# Patient Record
Sex: FEMALE
[De-identification: names, ages, dates, MRNs, and addresses within clinical notes are randomized per-mention and may not be internally consistent; named-entity substitution may affect disease eponyms.]

---

## 2022-07-27 ENCOUNTER — HOSPITAL ENCOUNTER (EMERGENCY)
Dept: HOSPITAL 33 - ED | Age: 67
Discharge: HOME | End: 2022-07-27
Payer: MEDICARE

## 2022-07-27 VITALS — DIASTOLIC BLOOD PRESSURE: 67 MMHG | SYSTOLIC BLOOD PRESSURE: 140 MMHG | HEART RATE: 56 BPM

## 2022-07-27 VITALS — OXYGEN SATURATION: 98 %

## 2022-07-27 DIAGNOSIS — I10: ICD-10-CM

## 2022-07-27 DIAGNOSIS — Z79.84: ICD-10-CM

## 2022-07-27 DIAGNOSIS — E11.9: ICD-10-CM

## 2022-07-27 DIAGNOSIS — M54.50: Primary | ICD-10-CM

## 2022-07-27 DIAGNOSIS — Z79.899: ICD-10-CM

## 2022-07-27 PROCEDURE — 99291 CRITICAL CARE FIRST HOUR: CPT

## 2022-07-27 PROCEDURE — 96372 THER/PROPH/DIAG INJ SC/IM: CPT

## 2022-07-27 PROCEDURE — 72131 CT LUMBAR SPINE W/O DYE: CPT

## 2022-07-27 PROCEDURE — 99283 EMERGENCY DEPT VISIT LOW MDM: CPT

## 2022-07-27 RX ADMIN — KETOROLAC TROMETHAMINE ONE MG: 30 INJECTION, SOLUTION INTRAMUSCULAR; INTRAVENOUS at 12:55

## 2022-07-27 NOTE — ERPHSYRPT
- History of Present Illness


Source: patient


Exam Limitations: no limitations


Patient Subjective Stated Complaint: C/O left lower back pain that started 

approx 3 weeks ago when she was bending over to clip her toenails. C/O that pain

is constant but different depending on what she is doing. Pain is a "grabbing" 

pain and at a 7 unless she moves a certain way and then the pain is a "sharp" 

pain that increases to a 10 and shoots down her thigh at times.


Triage Nursing Assessment: Patient ambulated back to ED. She is alert and 

oriented. No skin alterations noted to left lower back.


Physician History: 





67 yo WF w L CVA/gluteal pain x 3wks. Pain is 7/10, sharp, and worse w movement-

upright position. Pt denies trauma but did fall in April breaking her R tib-fib.

She denies dysuria/hematuria/fever. Pt has seen Dr. Davey about the pain and 

has had XR's. Pain radiates to LLE. Incontinence/paresthesia/paralysis all 

denied.


Timing/Duration: other (3 wks)


Method of Injury: unknown


Quality: sharp


Back Pain Location: paraspinous muscles


Back Pain Radiation: buttocks, upper legs


Severity of Pain-Max: moderate


Severity of Pain-Current: moderate


Modifying Factors: Improves With: movement


Associated Symptoms: lower back pain, No fever, No chills, No sweating, No 

urinary incontinence, No loss of bowel control, No constipation, No nausea, No 

vomiting, No problems urinating, No light-headedness, No dizziness, No numbness 

in legs/feet, No weakness, No sensory/motor loss, No tingling in legs/feet, No 

muscle spasms


Previous symptoms: same symptoms as today


Allergies/Adverse Reactions: 








No Known Drug Allergies Allergy (Verified 22 11:52)


   





Home Medications: 








Metformin HCl [Metformin HCl ER] 1 tab PO DAILY 22 [History]


Tizanidine HCl 4 mg** [Zanaflex 4 MG**] 1 tab PO Q8H PRN PRN 22 [History]





Hx Tetanus, Diphtheria Vaccination/Date Given: Yes


Hx Influenza Vaccination/Date Given: Yes


Hx Pneumococcal Vaccination/Date Given: No


Immunizations Up to Date: Yes





Travel Risk





- International Travel


Have you traveled outside of the country in past 3 weeks: No





- Coronavirus Screening


Are you exhibiting any of the following symptoms?: No


Close contact with a COVID-19 positive Pt in past 14-21 Days: No





- Vaccine Status


Have you recieved a Covid-19 vaccination: Yes


: Moderna





- Vaccination Dates


Date of 2cond Vaccination (if applicable): 





- Review of Systems


Constitutional: No Symptoms


Eyes: No Symptoms


Ears, Nose, & Throat: No Symptoms


Respiratory: No Symptoms


Cardiac: No Symptoms


Abdominal/Gastrointestinal: No Symptoms


Genitourinary Symptoms: No Symptoms


Musculoskeletal: No Symptoms, Back Pain


Skin: No Symptoms


Neurological: No Symptoms


Psychological: No Symptoms


Endocrine: No Symptoms


Hematologic/Lymphatic: No Symptoms


Immunological/Allergic: No Symptoms





- Past Medical History


Pertinent Past Medical History: Yes


Neurological History: Migraines, TIA


ENT History: No Pertinent History


Cardiac History: Hypertension


Respiratory History: No Pertinent History


Endocrine Medical History: Diabetes Type II, Hypothyroidism


Musculoskeletal History: Arthritis


GI Medical History: Gallbladder Disease


 History: No Pertinent History


Psycho-Social History: Anxiety


Female Reproductive Disorders: No Pertinent History


Other Medical History: Insomnia





- Past Surgical History


Past Surgical History: Yes


Gastrointestinal: Cholecystectomy


Female Surgical History:  Section


Other Surgical History: Foot with metal/screws in leg (right)





- Social History


Smoking Status: Former smoker


Exposure to second hand smoke: No


Drug Use: none


Patient Lives Alone: No


Significant Family History: no pertinent family hx





- Nursing Vital Signs


Nursing Vital Signs: 


                               Initial Vital Signs











Temperature  97.5 F   22 11:53


 


Pulse Rate  70   22 11:53


 


Respiratory Rate  18   22 11:53


 


Blood Pressure  131/94   22 11:53


 


O2 Sat by Pulse Oximetry  98   22 11:53








                                   Pain Scale











Pain Intensity []              7


 


Pain Intensity                 7











WNL





- Physical Exam


General Appearance: no apparent distress


Eye Exam: PERRL/EOMI, eyes nml inspection


Ears, Nose, Throat Exam: normal ENT inspection, TMs normal, pharynx normal, 

moist mucous membranes


Neck Exam: normal inspection, non-tender, supple, full range of motion, No 

meningismus, No mass, No Brudzinski, No Kernig's, No carotid bruit


Respiratory Exam: normal breath sounds, lungs clear, airway intact, No resp

iratory distress


Cardiovascular Exam: regular rate/rhythm, normal heart sounds, normal peripheral

 pulses, capillary refill <2 sec, No murmur


Gastrointestinal Exam: soft, normal bowel sounds, No tenderness


Back Exam: other (L gluteal/L posterior-superior iliac crest TTP/Pain w stiff 

leg raises on L), No vertebral tenderness


Neurologic Exam: alert, oriented x 3, cooperative, CNs II-XII nml as tested, 

normal mood/affect, nml cerebellar function, nml station & gait, sensation nml


Skin Exam: normal color, warm, dry


Lymphatic Exam: No adenopathy


**SpO2 Interpretation**: normal


SpO2: 98


O2 Delivery: Room Air





- Course


Nursing assessment & vital signs reviewed: Yes





- CT Exams


  ** Lumbar Spine


CT Interpretation: Discussed w/radiologist (L5-S1 broad based disc bulge wo 

canal stenosis)


Ordered Tests: 


                               Active Orders 24 hr











 Category Date Time Status


 


 LUMBAR SPINE W/O [CT] Stat Exams  22 13:02 Completed








Medication Summary














Discontinued Medications














Generic Name Dose Route Start Last Admin





  Trade Name Freq  PRN Reason Stop Dose Admin


 


Ketorolac Tromethamine  30 mg  22 12:10  22 12:55





  Ketorolac Tromethamine 30 Mg/Ml Inj  IM  22 12:11  30 mg





  STAT ONE   Administration


 


Ketorolac Tromethamine  Confirm  22 12:54 





  Ketorolac Tromethamine 30 Mg/Ml Inj  Administered  22 12:55 





  Dose  





  30 mg  





  .ROUTE  





  .STK-MED ONE  














- Progress


Progress Note: 





22 13:39


30mg IM Toradol


Counseled pt/family regarding: diagnosis, need for follow-up, rad results





- Departure


Departure Disposition: Home


Clinical Impression: 


 Back pain





Condition: Stable


Critical Care Time: No


Referrals: 


ZAHIDA DAVEY MD [Primary Care Provider] - Follow up/PCP as directed


Instructions:  Low Back Pain  (DC)


Additional Instructions: 


Rest/Heat/Massage


Follow up with Dr. Brook Hamlin as needed for pain


Try Norflex instead of Zanaflex


Prescriptions: 


Etodolac 400 mg [Lodine 400 mg] 400 mg PO TID PRN PRN #14 tablet


 PRN Reason: Pain


Orphenadrine Citrate 100 mg*** [Norflex 100 MG Tablet***] 100 mg PO BID PRN PRN 

#14 tab


 PRN Reason: Pain

## 2022-07-27 NOTE — XRAY
Indication: Low back pain 3 weeks.



Multiple contiguous images obtained through the lumbar spine.  Sagittal and

coronal reformatted images obtained.



Comparison: None



Axial images demonstrate minimal L4-S1 broad-based disc bulge without obvious

disc herniation or canal stenosis.  Mild multilevel anterior bridging and

nonbridging endplate osteophytes.  Facets are symmetric.



Sagittal and coronal reforming images demonstrates normal lumbar alignment.

Vertebral body heights/disc spaces maintained.  No acute compression fracture

or subluxation.



Visualized noncontrasted soft tissues demonstrates bilateral renal cortical

thinning/scarring with punctate calcification.  Mild aortoiliac calcifications

without AAA.



Impression:

1.  Minimal L4-S1 broad-based disc bulge and multilevel endplate spurring.

Remaining CT lumbar spine negative.

2.  Incidental bilateral renal cortical thinning/scarring with punctate

calcification and mild arteriosclerotic calcifications.